# Patient Record
Sex: MALE | Race: WHITE | NOT HISPANIC OR LATINO | Employment: UNEMPLOYED | URBAN - METROPOLITAN AREA
[De-identification: names, ages, dates, MRNs, and addresses within clinical notes are randomized per-mention and may not be internally consistent; named-entity substitution may affect disease eponyms.]

---

## 2017-06-27 ENCOUNTER — HOSPITAL ENCOUNTER (EMERGENCY)
Facility: HOSPITAL | Age: 22
Discharge: HOME | End: 2017-06-28
Attending: EMERGENCY MEDICINE
Payer: MEDICAID

## 2017-06-27 VITALS
RESPIRATION RATE: 16 BRPM | DIASTOLIC BLOOD PRESSURE: 78 MMHG | OXYGEN SATURATION: 100 % | HEIGHT: 71 IN | SYSTOLIC BLOOD PRESSURE: 130 MMHG | WEIGHT: 150 LBS | BODY MASS INDEX: 21 KG/M2 | TEMPERATURE: 100 F | HEART RATE: 98 BPM

## 2017-06-27 DIAGNOSIS — L02.91 ABSCESS: ICD-10-CM

## 2017-06-27 PROCEDURE — 10061 I&D ABSCESS COMP/MULTIPLE: CPT | Mod: LT,,, | Performed by: EMERGENCY MEDICINE

## 2017-06-27 PROCEDURE — 99283 EMERGENCY DEPT VISIT LOW MDM: CPT | Mod: 25,,, | Performed by: PHYSICIAN ASSISTANT

## 2017-06-27 PROCEDURE — 99283 EMERGENCY DEPT VISIT LOW MDM: CPT | Mod: 25

## 2017-06-27 PROCEDURE — 10061 I&D ABSCESS COMP/MULTIPLE: CPT

## 2017-06-28 PROCEDURE — 10061 I&D ABSCESS COMP/MULTIPLE: CPT

## 2017-06-28 PROCEDURE — 25000003 PHARM REV CODE 250: Performed by: EMERGENCY MEDICINE

## 2017-06-28 RX ORDER — SULFAMETHOXAZOLE AND TRIMETHOPRIM 800; 160 MG/1; MG/1
2 TABLET ORAL
Status: COMPLETED | OUTPATIENT
Start: 2017-06-28 | End: 2017-06-28

## 2017-06-28 RX ORDER — SULFAMETHOXAZOLE AND TRIMETHOPRIM 800; 160 MG/1; MG/1
2 TABLET ORAL 2 TIMES DAILY
Qty: 40 TABLET | Refills: 0 | Status: SHIPPED | OUTPATIENT
Start: 2017-06-28 | End: 2017-07-08

## 2017-06-28 RX ORDER — LIDOCAINE HYDROCHLORIDE 10 MG/ML
10 INJECTION INFILTRATION; PERINEURAL
Status: COMPLETED | OUTPATIENT
Start: 2017-06-28 | End: 2017-06-28

## 2017-06-28 RX ADMIN — SULFAMETHOXAZOLE AND TRIMETHOPRIM 2 TABLET: 800; 160 TABLET ORAL at 12:06

## 2017-06-28 RX ADMIN — LIDOCAINE HYDROCHLORIDE 10 ML: 10 INJECTION, SOLUTION INFILTRATION; PERINEURAL at 12:06

## 2017-06-28 NOTE — ED TRIAGE NOTES
21 year old male presents to the ED c/o abscess to the left forearm. States that he injected heroin last week there.

## 2017-06-28 NOTE — ED PROVIDER NOTES
Encounter Date: 6/27/2017    SCRIBE #1 NOTE: I, Yoanna Altman, am scribing for, and in the presence of,  Dr. Goel. I have scribed the entire note.       History     Chief Complaint   Patient presents with    Abscess     Left forearm.     Time patient was seen by the provider: 12:14 AM      The patient is a 21 y.o. male with no PMHx that presents to the ED with a complaint of abscess on his left forearm that started two weeks ago. Pt states it started very small and looked like a knot but has become bigger over the past two weeks. This morning the pt said he moved a certain way and it began to swell and green discharge came from the site. Pt went to another hospital and the abscess began to discharge blood and clear discharge. He was given tylenol at the other hospital because he had a fever. 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      The history is provided by the patient and a parent.     Review of patient's allergies indicates:  No Known Allergies  History reviewed. No pertinent past medical history.  History reviewed. No pertinent surgical history.  No family history on file.  Social History   Substance Use Topics    Smoking status: Current Every Day Smoker    Smokeless tobacco: Not on file    Alcohol use Yes     Review of Systems   Skin:        (+) left forearm abscess       Physical Exam     Initial Vitals [06/27/17 2305]   BP Pulse Resp Temp SpO2   130/78 98 16 99.5 °F (37.5 °C) 100 %      MAP       95.33         Physical Exam    Nursing note and vitals reviewed.  Constitutional: He appears well-developed and well-nourished. He is not diaphoretic. No distress.   Skin:   Pt has 8 by 10 cm area of erythema with central 3 by 3 cm of fluctuance         ED Course   I & D - Incision and Drainage  Date/Time: 6/28/2017 1:31 AM  Performed by: JAMEL VIRAMONTES  Authorized by: LUDMILA GOEL III   Body area: upper extremity  Location details: left arm  Anesthesia: local  infiltration    Anesthesia:  Local Anesthetic: lidocaine 1% without epinephrine  Anesthetic total: 3 mL  Patient sedated: no  Scalpel size: 11  Incision type: single straight  Complexity: complex  Drainage: pus,  bloody and  purulent  Drainage amount: copious  Wound treatment: incision and  wound packed  Packing material: 1/4 in gauze  Comments: Patient tolerated the procedure well        Labs Reviewed - No data to display       X-Rays:   Independently Interpreted Readings:   Other Readings:  Forearm XR: No acute process     Medical Decision Making:   History:   Old Medical Records: I decided to obtain old medical records.  Initial Assessment:   Pt with 8 by 10 cm area of erythema with central 3 by 3 cm fluctuance consistent with abscess and surrounding cellulitis will perform I&D and discharge home with antibiotics.  Independently Interpreted Test(s):   I have ordered and independently interpreted X-rays - see prior notes.  Clinical Tests:   Radiological Study: Ordered and Reviewed  ED Management:  Porter Gaytan  Performed Incision and drainage             Scribe Attestation:   Scribe #1: I performed the above scribed service and the documentation accurately describes the services I performed. I attest to the accuracy of the note.    Attending Attestation:     Physician Attestation Statement for NP/PA:   I discussed this assessment and plan of this patient with the NP/PA, but I did not personally examine the patient. The face to face encounter was performed by the NP/PA.    Other NP/PA Attestation Additions:    History of Present Illness: Arm abscess         Physician Attestation for Scribe:  Physician Attestation Statement for Scribe #1: I, Dr. Goel, reviewed documentation, as scribed by Yoanna Altman in my presence, and it is both accurate and complete.                 ED Course     Clinical Impression:   The encounter diagnosis was Abscess.    Disposition:   Disposition: Discharged  Condition:  Stable                        Caesar Gaytan PA-C  06/28/17 0132       Rik Goel III, MD  07/06/17 0651

## 2020-03-17 ENCOUNTER — TELEPHONE (OUTPATIENT)
Dept: ORTHOPEDICS | Facility: CLINIC | Age: 25
End: 2020-03-17

## 2020-03-17 DIAGNOSIS — S92.909B: Primary | ICD-10-CM

## 2021-04-16 ENCOUNTER — PATIENT MESSAGE (OUTPATIENT)
Dept: RESEARCH | Facility: HOSPITAL | Age: 26
End: 2021-04-16

## 2021-07-01 ENCOUNTER — PATIENT MESSAGE (OUTPATIENT)
Dept: ADMINISTRATIVE | Facility: OTHER | Age: 26
End: 2021-07-01